# Patient Record
Sex: FEMALE | Race: OTHER | HISPANIC OR LATINO | Employment: UNEMPLOYED | URBAN - NONMETROPOLITAN AREA
[De-identification: names, ages, dates, MRNs, and addresses within clinical notes are randomized per-mention and may not be internally consistent; named-entity substitution may affect disease eponyms.]

---

## 2023-04-28 ENCOUNTER — HOSPITAL ENCOUNTER (OUTPATIENT)
Facility: HOSPITAL | Age: 38
Discharge: HOME | End: 2023-04-28
Attending: OBSTETRICS & GYNECOLOGY | Admitting: OBSTETRICS & GYNECOLOGY
Payer: MEDICAID

## 2023-04-28 LAB
ALBUMIN SERPL-MCNC: 3.7 G/DL (ref 3.4–5)
ALBUMIN/GLOB SERPL: 1.2 RATIO
ALP SERPL-CCNC: 152 UNIT/L (ref 50–144)
ALT SERPL-CCNC: 15 UNIT/L (ref 1–45)
ANION GAP SERPL CALC-SCNC: 4 MEQ/L (ref 2–13)
APPEARANCE UR: CLEAR
AST SERPL-CCNC: 34 UNIT/L (ref 14–36)
BACTERIA #/AREA URNS AUTO: ABNORMAL /HPF
BASOPHILS # BLD AUTO: 0.01 X10(3)/MCL (ref 0.01–0.08)
BASOPHILS NFR BLD AUTO: 0.1 % (ref 0.1–1.2)
BILIRUB UR QL STRIP.AUTO: NEGATIVE MG/DL
BILIRUBIN DIRECT+TOT PNL SERPL-MCNC: 0.7 MG/DL (ref 0–1)
BUN SERPL-MCNC: 8 MG/DL (ref 7–20)
CALCIUM SERPL-MCNC: 8.9 MG/DL (ref 8.4–10.2)
CHLORIDE SERPL-SCNC: 108 MMOL/L (ref 98–110)
CO2 SERPL-SCNC: 25 MMOL/L (ref 21–32)
COLOR UR AUTO: YELLOW
CREAT SERPL-MCNC: 0.49 MG/DL (ref 0.66–1.25)
CREAT/UREA NIT SERPL: 16 (ref 12–20)
EOSINOPHIL # BLD AUTO: 0.09 X10(3)/MCL (ref 0.04–0.36)
EOSINOPHIL NFR BLD AUTO: 1 % (ref 0.7–7)
ERYTHROCYTE [DISTWIDTH] IN BLOOD BY AUTOMATED COUNT: 15.6 % (ref 11–14.5)
GFR SERPLBLD CREATININE-BSD FMLA CKD-EPI: >90 MLS/MIN/1.73/M2
GLOBULIN SER-MCNC: 3 GM/DL (ref 2–3.9)
GLUCOSE SERPL-MCNC: 75 MG/DL (ref 70–115)
GLUCOSE UR QL STRIP.AUTO: NEGATIVE MG/DL
HBV SURFACE AG SERPL QL IA: NEGATIVE
HBV SURFACE AG SERPL QL IA: NORMAL
HCT VFR BLD AUTO: 35.4 % (ref 36–48)
HGB BLD-MCNC: 11.6 G/DL (ref 11.8–16)
IMM GRANULOCYTES # BLD AUTO: 0.05 X10(3)/MCL (ref 0–0.03)
IMM GRANULOCYTES NFR BLD AUTO: 0.6 % (ref 0–0.5)
KETONES UR QL STRIP.AUTO: NEGATIVE MG/DL
LEUKOCYTE ESTERASE UR QL STRIP.AUTO: NEGATIVE UNIT/L
LYMPHOCYTES # BLD AUTO: 2.03 X10(3)/MCL (ref 1.16–3.74)
LYMPHOCYTES NFR BLD AUTO: 23.4 % (ref 20–55)
MCH RBC QN AUTO: 27.5 PG (ref 27–34)
MCV RBC AUTO: 83.9 FL (ref 79–99)
MEAN CELL HEMOGLOBIN CONCENTRATION (OHS) G/DL: 32.8 G/DL (ref 31–37)
MONOCYTES # BLD AUTO: 0.49 X10(3)/MCL (ref 0.24–0.36)
MONOCYTES NFR BLD AUTO: 5.7 % (ref 4.7–12.5)
NEUTROPHILS # BLD AUTO: 6 X10(3)/MCL (ref 1.56–6.13)
NEUTROPHILS NFR BLD AUTO: 69.2 % (ref 37–73)
NITRITE UR QL STRIP.AUTO: NEGATIVE
NRBC BLD AUTO-RTO: 0 % (ref 0–1)
PH UR STRIP.AUTO: 6.5 [PH]
PLATELET # BLD AUTO: 196 X10(3)/MCL (ref 140–371)
PMV BLD AUTO: 11 FL (ref 9.4–12.4)
POTASSIUM SERPL-SCNC: 4.4 MMOL/L (ref 3.5–5.1)
PROT SERPL-MCNC: 6.7 GM/DL (ref 6.3–8.2)
PROT UR QL STRIP.AUTO: NEGATIVE MG/DL
RBC # BLD AUTO: 4.22 X10(6)/MCL (ref 4–5.1)
RBC #/AREA URNS AUTO: ABNORMAL /HPF
RBC UR QL AUTO: ABNORMAL UNIT/L
SODIUM SERPL-SCNC: 137 MMOL/L (ref 135–145)
SP GR UR STRIP.AUTO: 1.02
SQUAMOUS #/AREA URNS AUTO: ABNORMAL /HPF
UROBILINOGEN UR STRIP-ACNC: 0.2 MG/DL
WBC # SPEC AUTO: 8.7 X10(3)/MCL (ref 4–11.5)
WBC #/AREA URNS AUTO: ABNORMAL /HPF

## 2023-04-28 PROCEDURE — 85025 COMPLETE CBC W/AUTO DIFF WBC: CPT | Performed by: OBSTETRICS & GYNECOLOGY

## 2023-04-28 PROCEDURE — 36415 COLL VENOUS BLD VENIPUNCTURE: CPT | Performed by: OBSTETRICS & GYNECOLOGY

## 2023-04-28 PROCEDURE — 86780 TREPONEMA PALLIDUM: CPT | Performed by: OBSTETRICS & GYNECOLOGY

## 2023-04-28 PROCEDURE — 87340 HEPATITIS B SURFACE AG IA: CPT | Performed by: OBSTETRICS & GYNECOLOGY

## 2023-04-28 PROCEDURE — 81001 URINALYSIS AUTO W/SCOPE: CPT | Performed by: OBSTETRICS & GYNECOLOGY

## 2023-04-28 PROCEDURE — 80053 COMPREHEN METABOLIC PANEL: CPT | Performed by: OBSTETRICS & GYNECOLOGY

## 2023-04-28 PROCEDURE — 87389 HIV-1 AG W/HIV-1&-2 AB AG IA: CPT | Performed by: OBSTETRICS & GYNECOLOGY

## 2023-04-28 PROCEDURE — 87653 STREP B DNA AMP PROBE: CPT | Performed by: OBSTETRICS & GYNECOLOGY

## 2023-04-28 NOTE — H&P
[facility]  History & Physical    Patient Name: Natasha Gonzales  MRN: [id number]  Admission Date: 4/28/2023  Primary Care Provider: No primary care provider on file. None    Subjective:     Principal Problem:<principal problem not specified> Passage of clear vaginal Fluid    History of Present Illness: Passage of clear fluid, average (not heavy) amount this afternoon.     Obstetric HPI:  Patient reports mild contractions, active fetal movement, Yes  light vaginal bleeding with pelvic exam done when she came in L&D, Yes loss of fluid at home which is the reason she came ot the hospital. The fluid is clear, not heavy but did flow out of her. No fluid came out of her while we were observing her for over an hour.   Her OB nurse did a pelvic and Cervix was noted to be 4 cm/50%/03 vertex. Nitrazine test was negative and no fluid was noted with the examining fingers. She was noted to have fluid tinge vaginal discharge with the exam.   Monitoring for over an hour showed absence of a contraction pattern.     This pregnancy has been complicated by lack of prenatal care. [place] a [place] ago. She said she had one prenatal visit in [place] and had a blood test early on her pregnancy.     G 3 , Both past deliveries were not difficult and were 5.5 and 5.9 lbs. Respectively. She says her during her second pregnancy she had BP problems. No therapy was given her.   No past medical history on file. And so I interviewed her. She does not have any chronic medical illness.   No past surgical history on file. On interview, she says she had laparoscopic cholecystectomy in [place].     No medications prior to admission.   She took prenatal vitamins early in her pregnancy but has not taken any for several months now.     Review of patient's allergies indicates:  Not on File     Family History    None       Tobacco Use    Smoking status: Not on file     Smokeless tobacco: Not on file   Substance and Sexual Activity    Alcohol use: Not on file    Drug use: Not on file    Sexual activity: Not on file     She does not smoke or drink.   141, Mildly obese  Review of Systems   Constitutional: Negative.    Respiratory: Negative.     Cardiovascular: Negative.    Gastrointestinal: Negative.        Vital Signs (Most Recent):   131/84 Vital Signs (24h Range):   141/88        There is no height or weight on file to calculate BMI.    FHT: 140 Cat 2 (reassuring)  TOCO:  Q 0 minutes - No measurable contraction pattern noted.     Physical Exam:   Constitutional: She appears well-nourished.       Cardiovascular:  Normal rate.             Pulmonary/Chest: Effort normal.        Abdominal: Soft.                  No contraction pattern on the fetal monitor.   Repeat Pelvic Exam: No cervical change remaining at 4 cm/60%/-3, foremembrane palpable, intact, no fluid return in my examining fingers., vertex presentation Blood tinge discharge in the bedpad noted, not large amount.    Objective:       Cervix:  Dilation:  4  Effacement:  50%  Station: -3  Presentation: Vertex     Significant Labs:  No results found for: GROUPTRH, HEPBSAG, RUBELLAIGGSC, STREPBCULT, AFP, [id number]  CBC - HB 11.6Gm%; Hct- 36.1    Fetal Monitoring: Meets criteria for Reactive NST;   Tocotransducer monitoring: No regular contraction pattern.    CBC:   Recent Labs   Lab 23  1622   WBC 8.7   RBC 4.22   HGB 11.6*   HCT 35.4*      MCV 83.9   MCH 27.5   MCHC 32.8     I have personallly reviewed all pertinent lab results from the last 24 hours.    Assessment/Plan:     37 y.o. female  at Unknown Gestational Age with Intact Amniotic Sac  Inadequate Prenatal Care  Unruptured Amniotic Sac (Note: Patient came to L&D for evaluation of passage of vaginal fluid.)  Not in Labor.     There are no hospital problems to display for this patient.      OB ultrasound: Vertex presentation, 36W 5D Estimated Gestational  Age, BPP 8/8, EVELINE 10.9 cm., Placenta Posterior    Disposition: She is being discharged home with instructions to :  Return if she has vaginal fluid drainage again  Return if she is having contractions [id number] or more frequent contractions.  We emphasized that she needs prenatal care this coming week and the phone numbers of Sruthi Camacho and [facility] were given to her as well as their office numbers.   Her BMP is not in yet. I will check it before I leave the hospital.   She will also leave us urine for urinalysis before she goes home./  Prenatal lab work blood sample obtained an GBS screen was done today.   She will need OGTT during her prenatal visit.     Demarco Finley MD  Obstetrics  [facility]-Labor [facility] Delivery

## 2023-04-28 NOTE — NURSING
DR. HUNT AT BEDSIDE TO ASSESS PATIENT. DETAILED HISTORY AND PHYSICAL OBTAINED.  PRESENT. SVE DONE PER DR. MILNER. NO CERVICAL CHANGE NOTED UPON CERVICAL EXAM. LIGHT VAGINAL BLEEDING NOTED UPON EXAM. DR. MILNER NOTIFIED OF US RESULTS OF PATIENT EDC PER US 36.3, 8/8, EVELINE 10.9, PLACENTA POSTERIOR. AWAITING LABS BEFORE DISCHARGING PATIENT PER DR. MILNER. ORDERS.

## 2023-04-28 NOTE — NURSING
DR. HUNT NOTIFIED OF PATIENT STATUS. ORDERS NOTED TO GET U/S, GBS CULTURE, AND PRENATAL LABS. DR. HILL WILL COME IN ONE HOUR TO CHECK PATIENT'S CERVIX.

## 2023-04-28 NOTE — NURSING
DR. MILNER EDUCATED PATIENT ON LABOR PRECAUTIONS AND INSTRUCTED PATIENT TO FOLLOW UP WITH OBSTETRICIAN. PATIENT GIVEN INFORMATION TO CONTACT ALL 3 OB DRS. PATIENT VERBALIZED UNDERSTANDING. PATIENT DISCHARGED IN STABLE CONDITION.

## 2023-04-28 NOTE — NURSING
[place] PATIENT PRESENTED TO OB WITH COMPLAINTS OF LEAKING OF FLUID AND SMALL AMOUNT OF BLEEDING THIS AM AT 5 AM. PATIENT STATES SHE HAS MILD PAIN PER . PATIENT PLACED ON EFM AND TOCO MONITOR. NITRAZINE TEST NEGATIVE. SVE DONE. CERVIX 4/50%/-2.

## 2023-04-29 LAB
STREP B PCR (OHS): NOT DETECTED
T PALLIDUM AB SER QL: NONREACTIVE

## 2023-05-01 ENCOUNTER — HOSPITAL ENCOUNTER (EMERGENCY)
Facility: HOSPITAL | Age: 38
Discharge: ANOTHER HOSPITAL | End: 2023-05-01
Attending: INTERNAL MEDICINE
Payer: MEDICAID

## 2023-05-01 ENCOUNTER — HOSPITAL ENCOUNTER (INPATIENT)
Facility: HOSPITAL | Age: 38
LOS: 2 days | Discharge: HOME | End: 2023-05-03
Attending: OBSTETRICS & GYNECOLOGY | Admitting: OBSTETRICS & GYNECOLOGY
Payer: MEDICAID

## 2023-05-01 VITALS
OXYGEN SATURATION: 99 % | DIASTOLIC BLOOD PRESSURE: 108 MMHG | RESPIRATION RATE: 20 BRPM | TEMPERATURE: 98 F | HEART RATE: 83 BPM | WEIGHT: 182.38 LBS | SYSTOLIC BLOOD PRESSURE: 163 MMHG

## 2023-05-01 DIAGNOSIS — Z37.9 NORMAL LABOR: Primary | ICD-10-CM

## 2023-05-01 DIAGNOSIS — O47.9 UTERINE CONTRACTIONS DURING PREGNANCY: Primary | ICD-10-CM

## 2023-05-01 LAB
ALBUMIN SERPL-MCNC: 3 G/DL (ref 3.5–5)
ALBUMIN/GLOB SERPL: 0.9 RATIO (ref 1.1–2)
ALP SERPL-CCNC: 151 UNIT/L (ref 40–150)
ALT SERPL-CCNC: 11 UNIT/L (ref 0–55)
APTT PPP: 26.4 SECONDS (ref 23.2–33.7)
AST SERPL-CCNC: 18 UNIT/L (ref 5–34)
BASOPHILS # BLD AUTO: 0.03 X10(3)/MCL (ref 0–0.2)
BASOPHILS NFR BLD AUTO: 0.3 %
BILIRUBIN DIRECT+TOT PNL SERPL-MCNC: 0.5 MG/DL
BUN SERPL-MCNC: 8 MG/DL (ref 7–18.7)
CALCIUM SERPL-MCNC: 9.3 MG/DL (ref 8.4–10.2)
CHLORIDE SERPL-SCNC: 109 MMOL/L (ref 98–107)
CO2 SERPL-SCNC: 20 MMOL/L (ref 22–29)
CREAT SERPL-MCNC: 0.55 MG/DL (ref 0.55–1.02)
CREAT UR-MCNC: 104.7 MG/DL (ref 47–110)
EOSINOPHIL # BLD AUTO: 0.11 X10(3)/MCL (ref 0–0.9)
EOSINOPHIL NFR BLD AUTO: 1 %
ERYTHROCYTE [DISTWIDTH] IN BLOOD BY AUTOMATED COUNT: 15.6 % (ref 11.5–17)
GFR SERPLBLD CREATININE-BSD FMLA CKD-EPI: >60 MLS/MIN/1.73/M2
GLOBULIN SER-MCNC: 3.5 GM/DL (ref 2.4–3.5)
GLUCOSE SERPL-MCNC: 86 MG/DL (ref 74–100)
GROUP & RH: NORMAL
HCT VFR BLD AUTO: 36.9 % (ref 37–47)
HGB BLD-MCNC: 11.8 G/DL (ref 12–16)
HIV 1+2 AB+HIV1 P24 AG SERPL QL IA: NONREACTIVE
HIV 1+2 AB+HIV1 P24 AG SERPL QL IA: NONREACTIVE
IMM GRANULOCYTES # BLD AUTO: 0.08 X10(3)/MCL (ref 0–0.04)
IMM GRANULOCYTES NFR BLD AUTO: 0.7 %
INR BLD: 0.93 (ref 0–1.3)
LYMPHOCYTES # BLD AUTO: 2.38 X10(3)/MCL (ref 0.6–4.6)
LYMPHOCYTES NFR BLD AUTO: 20.8 %
MCH RBC QN AUTO: 27.1 PG (ref 27–31)
MCHC RBC AUTO-ENTMCNC: 32 G/DL (ref 33–36)
MCV RBC AUTO: 84.8 FL (ref 80–94)
MONOCYTES # BLD AUTO: 0.45 X10(3)/MCL (ref 0.1–1.3)
MONOCYTES NFR BLD AUTO: 3.9 %
NEUTROPHILS # BLD AUTO: 8.39 X10(3)/MCL (ref 2.1–9.2)
NEUTROPHILS NFR BLD AUTO: 73.3 %
PLATELET # BLD AUTO: 202 X10(3)/MCL (ref 130–400)
PMV BLD AUTO: 11.4 FL (ref 7.4–10.4)
POTASSIUM SERPL-SCNC: 3.9 MMOL/L (ref 3.5–5.1)
PROT SERPL-MCNC: 6.5 GM/DL (ref 6.4–8.3)
PROT UR STRIP-MCNC: <6.8 MG/DL
PROTHROMBIN TIME: 12.8 SECONDS (ref 12.5–14.5)
RBC # BLD AUTO: 4.35 X10(6)/MCL (ref 4.2–5.4)
SODIUM SERPL-SCNC: 138 MMOL/L (ref 136–145)
WBC # SPEC AUTO: 11.4 X10(3)/MCL (ref 4.5–11.5)

## 2023-05-01 PROCEDURE — 85730 THROMBOPLASTIN TIME PARTIAL: CPT | Performed by: INTERNAL MEDICINE

## 2023-05-01 PROCEDURE — 85025 COMPLETE CBC W/AUTO DIFF WBC: CPT | Performed by: INTERNAL MEDICINE

## 2023-05-01 PROCEDURE — 87389 HIV-1 AG W/HIV-1&-2 AB AG IA: CPT | Performed by: OBSTETRICS & GYNECOLOGY

## 2023-05-01 PROCEDURE — 63600175 PHARM REV CODE 636 W HCPCS

## 2023-05-01 PROCEDURE — 72100002 HC LABOR CARE, 1ST 8 HOURS

## 2023-05-01 PROCEDURE — 86900 BLOOD TYPING SEROLOGIC ABO: CPT | Performed by: INTERNAL MEDICINE

## 2023-05-01 PROCEDURE — 82570 ASSAY OF URINE CREATININE: CPT

## 2023-05-01 PROCEDURE — 72200004 HC VAGINAL DELIVERY LEVEL I

## 2023-05-01 PROCEDURE — 90471 IMMUNIZATION ADMIN: CPT | Performed by: OBSTETRICS & GYNECOLOGY

## 2023-05-01 PROCEDURE — 80053 COMPREHEN METABOLIC PANEL: CPT | Performed by: INTERNAL MEDICINE

## 2023-05-01 PROCEDURE — 85610 PROTHROMBIN TIME: CPT | Performed by: INTERNAL MEDICINE

## 2023-05-01 PROCEDURE — 25000003 PHARM REV CODE 250: Performed by: OBSTETRICS & GYNECOLOGY

## 2023-05-01 PROCEDURE — 63600175 PHARM REV CODE 636 W HCPCS: Performed by: OBSTETRICS & GYNECOLOGY

## 2023-05-01 PROCEDURE — 99285 EMERGENCY DEPT VISIT HI MDM: CPT

## 2023-05-01 PROCEDURE — 99999 HC NO LEVEL OF SERVICE - ED ONLY: CPT

## 2023-05-01 PROCEDURE — 11000001 HC ACUTE MED/SURG PRIVATE ROOM

## 2023-05-01 PROCEDURE — 25000003 PHARM REV CODE 250: Performed by: INTERNAL MEDICINE

## 2023-05-01 PROCEDURE — 90715 TDAP VACCINE 7 YRS/> IM: CPT | Performed by: OBSTETRICS & GYNECOLOGY

## 2023-05-01 RX ORDER — OXYTOCIN/RINGER'S LACTATE 30/500 ML
95 PLASTIC BAG, INJECTION (ML) INTRAVENOUS ONCE
Status: DISCONTINUED | OUTPATIENT
Start: 2023-05-01 | End: 2023-05-03 | Stop reason: HOSPADM

## 2023-05-01 RX ORDER — OXYTOCIN/RINGER'S LACTATE 30/500 ML
PLASTIC BAG, INJECTION (ML) INTRAVENOUS
Status: COMPLETED
Start: 2023-05-01 | End: 2023-05-01

## 2023-05-01 RX ORDER — PROCHLORPERAZINE EDISYLATE 5 MG/ML
5 INJECTION INTRAMUSCULAR; INTRAVENOUS EVERY 6 HOURS PRN
Status: DISCONTINUED | OUTPATIENT
Start: 2023-05-01 | End: 2023-05-03 | Stop reason: HOSPADM

## 2023-05-01 RX ORDER — PRENATAL WITH FERROUS FUM AND FOLIC ACID 3080; 920; 120; 400; 22; 1.84; 3; 20; 10; 1; 12; 200; 27; 25; 2 [IU]/1; [IU]/1; MG/1; [IU]/1; MG/1; MG/1; MG/1; MG/1; MG/1; MG/1; UG/1; MG/1; MG/1; MG/1; MG/1
1 TABLET ORAL DAILY
Status: DISCONTINUED | OUTPATIENT
Start: 2023-05-01 | End: 2023-05-03 | Stop reason: HOSPADM

## 2023-05-01 RX ORDER — DOCUSATE SODIUM 100 MG/1
200 CAPSULE, LIQUID FILLED ORAL 2 TIMES DAILY PRN
Status: DISCONTINUED | OUTPATIENT
Start: 2023-05-01 | End: 2023-05-03 | Stop reason: HOSPADM

## 2023-05-01 RX ORDER — HYDROCODONE BITARTRATE AND ACETAMINOPHEN 5; 325 MG/1; MG/1
1 TABLET ORAL EVERY 4 HOURS PRN
Status: DISCONTINUED | OUTPATIENT
Start: 2023-05-01 | End: 2023-05-03 | Stop reason: HOSPADM

## 2023-05-01 RX ORDER — ACETAMINOPHEN 325 MG/1
650 TABLET ORAL EVERY 6 HOURS PRN
Status: DISCONTINUED | OUTPATIENT
Start: 2023-05-01 | End: 2023-05-03 | Stop reason: HOSPADM

## 2023-05-01 RX ORDER — METHYLERGONOVINE MALEATE 0.2 MG/ML
200 INJECTION INTRAVENOUS
Status: DISCONTINUED | OUTPATIENT
Start: 2023-05-01 | End: 2023-05-03 | Stop reason: HOSPADM

## 2023-05-01 RX ORDER — ONDANSETRON 4 MG/1
8 TABLET, ORALLY DISINTEGRATING ORAL EVERY 8 HOURS PRN
Status: DISCONTINUED | OUTPATIENT
Start: 2023-05-01 | End: 2023-05-03 | Stop reason: HOSPADM

## 2023-05-01 RX ORDER — DIPHENHYDRAMINE HYDROCHLORIDE 50 MG/ML
25 INJECTION INTRAMUSCULAR; INTRAVENOUS EVERY 4 HOURS PRN
Status: DISCONTINUED | OUTPATIENT
Start: 2023-05-01 | End: 2023-05-03 | Stop reason: HOSPADM

## 2023-05-01 RX ORDER — OXYTOCIN/RINGER'S LACTATE 30/500 ML
95 PLASTIC BAG, INJECTION (ML) INTRAVENOUS ONCE AS NEEDED
Status: DISCONTINUED | OUTPATIENT
Start: 2023-05-01 | End: 2023-05-03 | Stop reason: HOSPADM

## 2023-05-01 RX ORDER — SODIUM CHLORIDE 0.9 % (FLUSH) 0.9 %
2 SYRINGE (ML) INJECTION EVERY 8 HOURS PRN
Status: DISCONTINUED | OUTPATIENT
Start: 2023-05-01 | End: 2023-05-03 | Stop reason: HOSPADM

## 2023-05-01 RX ORDER — HYDROCODONE BITARTRATE AND ACETAMINOPHEN 5; 325 MG/1; MG/1
1 TABLET ORAL EVERY 4 HOURS PRN
Status: DISCONTINUED | OUTPATIENT
Start: 2023-05-01 | End: 2023-05-01

## 2023-05-01 RX ORDER — DEXTROSE, SODIUM CHLORIDE, SODIUM LACTATE, POTASSIUM CHLORIDE, AND CALCIUM CHLORIDE 5; .6; .31; .03; .02 G/100ML; G/100ML; G/100ML; G/100ML; G/100ML
INJECTION, SOLUTION INTRAVENOUS CONTINUOUS
Status: DISCONTINUED | OUTPATIENT
Start: 2023-05-01 | End: 2023-05-03 | Stop reason: HOSPADM

## 2023-05-01 RX ORDER — MISOPROSTOL 100 UG/1
800 TABLET ORAL ONCE AS NEEDED
Status: DISCONTINUED | OUTPATIENT
Start: 2023-05-01 | End: 2023-05-03 | Stop reason: HOSPADM

## 2023-05-01 RX ORDER — SODIUM CHLORIDE 9 MG/ML
1000 INJECTION, SOLUTION INTRAVENOUS
Status: COMPLETED | OUTPATIENT
Start: 2023-05-01 | End: 2023-05-01

## 2023-05-01 RX ORDER — SIMETHICONE 80 MG
1 TABLET,CHEWABLE ORAL EVERY 6 HOURS PRN
Status: DISCONTINUED | OUTPATIENT
Start: 2023-05-01 | End: 2023-05-03 | Stop reason: HOSPADM

## 2023-05-01 RX ORDER — CARBOPROST TROMETHAMINE 250 UG/ML
250 INJECTION, SOLUTION INTRAMUSCULAR
Status: DISCONTINUED | OUTPATIENT
Start: 2023-05-01 | End: 2023-05-03 | Stop reason: HOSPADM

## 2023-05-01 RX ORDER — HYDROCORTISONE 25 MG/G
CREAM TOPICAL 3 TIMES DAILY PRN
Status: DISCONTINUED | OUTPATIENT
Start: 2023-05-01 | End: 2023-05-03 | Stop reason: HOSPADM

## 2023-05-01 RX ORDER — OXYTOCIN 10 [USP'U]/ML
10 INJECTION, SOLUTION INTRAMUSCULAR; INTRAVENOUS ONCE AS NEEDED
Status: DISCONTINUED | OUTPATIENT
Start: 2023-05-01 | End: 2023-05-03 | Stop reason: HOSPADM

## 2023-05-01 RX ORDER — IBUPROFEN 600 MG/1
600 TABLET ORAL EVERY 6 HOURS
Status: DISCONTINUED | OUTPATIENT
Start: 2023-05-01 | End: 2023-05-03 | Stop reason: HOSPADM

## 2023-05-01 RX ORDER — DIPHENHYDRAMINE HCL 25 MG
25 CAPSULE ORAL EVERY 4 HOURS PRN
Status: DISCONTINUED | OUTPATIENT
Start: 2023-05-01 | End: 2023-05-03 | Stop reason: HOSPADM

## 2023-05-01 RX ORDER — OXYTOCIN/RINGER'S LACTATE 30/500 ML
334 PLASTIC BAG, INJECTION (ML) INTRAVENOUS ONCE AS NEEDED
Status: COMPLETED | OUTPATIENT
Start: 2023-05-01 | End: 2023-05-01

## 2023-05-01 RX ADMIN — SODIUM CHLORIDE 1000 ML: 9 INJECTION, SOLUTION INTRAVENOUS at 02:05

## 2023-05-01 RX ADMIN — TETANUS TOXOID, REDUCED DIPHTHERIA TOXOID AND ACELLULAR PERTUSSIS VACCINE, ADSORBED 0.5 ML: 5; 2.5; 8; 8; 2.5 SUSPENSION INTRAMUSCULAR at 05:05

## 2023-05-01 RX ADMIN — Medication 334 MILLI-UNITS/MIN: at 03:05

## 2023-05-01 RX ADMIN — IBUPROFEN 600 MG: 600 TABLET, FILM COATED ORAL at 06:05

## 2023-05-01 RX ADMIN — HYDROCODONE BITARTRATE AND ACETAMINOPHEN 1 TABLET: 5; 325 TABLET ORAL at 04:05

## 2023-05-01 NOTE — OP NOTE
OPERATIVE REPORT    Date of Procedure: 2023    Patient Name: Natasha Gonzales    MRN: [id number]    Surgeon: Manny Reddy MD    Assistant:     Pre-Operative Diagnosis:  Second-stage labor  Post-Operative Diagnosis:  Delivered  Anesthesia: None    Procedure:       Complications: No    Estimated Blood Loss (EBL): 300           Findings: Live Female    Specimens: Cord Blood    Description:  Patient was prepped and draped in a normal sterile fashion. The 2nd stage of labor was within normal limits. The patient's head delivered in DEMETRIUS. Infants head delivered without any difficulties along with the shoulders and the rest of the infant. No Nuchal cords and Clear fluid noted. Mouth and nose were suctioned on the abdomen. Cord was doubly clamped and cut. The infant was handed to the waiting staff. Cord blood taken. The placenta was extracted spontaneously. There was No lacerations on the perineum and none laceration the labia  . Estimated blood loss was 300 patient tolerated the procedure well and instrument and sponge count were correct patient was to recover in labor room for approximately 1 hour.

## 2023-05-01 NOTE — H&P
Labor and Delivery Admit               History and Physical       Natasha Gonzales is a 37 y.o.  female with IUP at 37w4d weeks gestation who presents with as a transfer from [facility].  When she arrived she was fully dilated and transferred to a delivery bed.    Patient reports contractions, denies vaginal bleeding, reports LOF.   Fetal Movement: normal.    This IUP is complicated by minimal prenatal care recently moved from [place].      PMHx: No past medical history on file.    PSHx:   Cholecystectomy    All: Review of patient's allergies indicates:  No Known Allergies    Meds:   No medications prior to admission.       SH:   Social History     Socioeconomic History    Marital status: Single   Tobacco Use    Smoking status: Never    Smokeless tobacco: Never       FH: No family history on file.    OBHx:    all previous vaginal deliveries    Objective:       LMP  (LMP Unknown)     There were no vitals filed for this visit.    General:   alert, appears stated age and cooperative, no apparent distress   HENT:  normocephalic, atraumatic   Eyes:  extraocular movements and conjunctivae normal       Lungs:   no respiratory distress   Heart:   regular rate   Abdomen:  soft, non-tender, non-distended but gravid, no rebound or guarding    Extremities negative edema, negative erythema       Cervix:                                       Electronic Fetal Monitoring:  FHT:  bpm,  fetal heart rate tracing was not extensive enough to determine baseline accelerations and/or decelerations    Category:  Unknown                 TOCO: Contractions:  Patient pushing on arrival             Assessment:       37w4d weeks gestation transferred from [facility] arrived fully dilated and pushing             Plan:      - Admit to Labor and Delivery unit  - admission labs   -delivery of infant  IV fluids and Pitocin        This note was created with the assistance of MModal voice recognition  software. There may be transcription errors as a result of using this technology however minimal. Effort has been made to assure accuracy of transcription but any obvious errors or omissions should be clarified with the author of the document.

## 2023-05-01 NOTE — ED PROVIDER NOTES
Encounter Date: 2023  History from patient through      History     Chief Complaint   Patient presents with    Laboring     Began at 2300 with pelvic pain. Approx 8 months preg with no prenatal care. States water broke.      HPI    37 y.o. female  has no past medical history on file. Presenting with  Laboring (Began at 2300 with pelvic pain. Approx 8 months preg with no prenatal care. States water broke. )      Review of patient's allergies indicates:  No Known Allergies  No past medical history on file.  Past Surgical History:   Procedure Laterality Date     SECTION       No family history on file.  Social History     Tobacco Use    Smoking status: Never    Smokeless tobacco: Never     Review of Systems   HENT:  Negative for trouble swallowing and voice change.    Eyes:  Negative for visual disturbance.   Respiratory:  Negative for cough and shortness of breath.    Cardiovascular:  Negative for chest pain.   Gastrointestinal:  Negative for abdominal pain, diarrhea and vomiting.   Genitourinary:  Positive for pelvic pain and vaginal discharge. Negative for dysuria and hematuria.   Musculoskeletal:  Negative for gait problem.        No Pain.   Skin:  Negative for color change and rash.   Neurological:  Negative for headaches.   Psychiatric/Behavioral:  Negative for behavioral problems and sleep disturbance.    All other systems reviewed and are negative.    Physical Exam     Initial Vitals [23 0205]   BP Pulse Resp Temp SpO2   (!) 163/108 83 20 97.5 °F (36.4 °C) 99 %      MAP       --         Physical Exam    Nursing note and vitals reviewed.  Constitutional: She appears well-developed and well-nourished. No distress.   HENT:   Head: Atraumatic.   Eyes: EOM are normal.   Neck: Neck supple.   Cardiovascular:  Normal rate and regular rhythm.           Pulmonary/Chest: Breath sounds normal. No respiratory distress.   Abdominal: Abdomen is soft. Bowel sounds are normal. She exhibits  distension.   Genitourinary:    Pelvic exam was performed with patient supine.   Uterus is enlarged. Right adnexum displays no tenderness. Left adnexum displays no tenderness.    Vaginal discharge and bleeding present.   There is bleeding in the vagina.    Genitourinary Comments: 2 cm dilated soft cervix, minimal discharge in the vagina, blood-tinged,     Musculoskeletal:         General: Normal range of motion.      Cervical back: Neck supple.     Neurological: She is alert and oriented to person, place, and time. GCS score is 15. GCS eye subscore is 4. GCS verbal subscore is 5. GCS motor subscore is 6.   Skin: Skin is warm and dry.   Psychiatric: She has a normal mood and affect.       ED Course   Procedures    Orders Placed This Encounter   Procedures    CBC auto differential    Comprehensive metabolic panel    APTT    Protime-INR    CBC with Differential    ABO/Rh    PFC Facilitated Request     Medications   0.9%  NaCl infusion (1,000 mLs Intravenous New Bag 5/1/23 0237)     Admission on 05/01/2023, Discharged on 05/01/2023   Component Date Value Ref Range Status    Sodium Level 05/01/2023 138  136 - 145 mmol/L Final    Potassium Level 05/01/2023 3.9  3.5 - 5.1 mmol/L Final    Chloride 05/01/2023 109 (H)  98 - 107 mmol/L Final    Carbon Dioxide 05/01/2023 20 (L)  22 - 29 mmol/L Final    Glucose Level 05/01/2023 86  74 - 100 mg/dL Final    Blood Urea Nitrogen 05/01/2023 8.0  7.0 - 18.7 mg/dL Final    Creatinine 05/01/2023 0.55  0.55 - 1.02 mg/dL Final    Calcium Level Total 05/01/2023 9.3  8.4 - 10.2 mg/dL Final    Protein Total 05/01/2023 6.5  6.4 - 8.3 gm/dL Final    Albumin Level 05/01/2023 3.0 (L)  3.5 - 5.0 g/dL Final    Globulin 05/01/2023 3.5  2.4 - 3.5 gm/dL Final    Albumin/Globulin Ratio 05/01/2023 0.9 (L)  1.1 - 2.0 ratio Final    Bilirubin Total 05/01/2023 0.5  <=1.5 mg/dL Final    Alkaline Phosphatase 05/01/2023 151 (H)  40 - 150 unit/L Final    Alanine Aminotransferase 05/01/2023 11  0 - 55 unit/L  Final    Aspartate Aminotransferase 05/01/2023 18  5 - 34 unit/L Final    eGFR 05/01/2023 >60  mls/min/1.73/m2 Final    PTT 05/01/2023 26.4  23.2 - 33.7 seconds Final    PT 05/01/2023 12.8  12.5 - 14.5 seconds Final    INR 05/01/2023 0.93  0.00 - 1.30 Final    Group & Rh 05/01/2023 O POS   Final    WBC 05/01/2023 11.4  4.5 - 11.5 x10(3)/mcL Final    RBC 05/01/2023 4.35  4.20 - 5.40 x10(6)/mcL Final    Hgb 05/01/2023 11.8 (L)  12.0 - 16.0 g/dL Final    Hct 05/01/2023 36.9 (L)  37.0 - 47.0 % Final    MCV 05/01/2023 84.8  80.0 - 94.0 fL Final    MCH 05/01/2023 27.1  27.0 - 31.0 pg Final    MCHC 05/01/2023 32.0 (L)  33.0 - 36.0 g/dL Final    RDW 05/01/2023 15.6  11.5 - 17.0 % Final    Platelet 05/01/2023 202  130 - 400 x10(3)/mcL Final    MPV 05/01/2023 11.4 (H)  7.4 - 10.4 fL Final    Neut % 05/01/2023 73.3  % Final    Lymph % 05/01/2023 20.8  % Final    Mono % 05/01/2023 3.9  % Final    Eos % 05/01/2023 1.0  % Final    Basophil % 05/01/2023 0.3  % Final    Lymph # 05/01/2023 2.38  0.6 - 4.6 x10(3)/mcL Final    Neut # 05/01/2023 8.39  2.1 - 9.2 x10(3)/mcL Final    Mono # 05/01/2023 0.45  0.1 - 1.3 x10(3)/mcL Final    Eos # 05/01/2023 0.11  0 - 0.9 x10(3)/mcL Final    Baso # 05/01/2023 0.03  0 - 0.2 x10(3)/mcL Final    IG# 05/01/2023 0.08 (H)  0 - 0.04 x10(3)/mcL Final    IG% 05/01/2023 0.7  % Final       Labs Reviewed   COMPREHENSIVE METABOLIC PANEL - Abnormal; Notable for the following components:       Result Value    Chloride 109 (*)     Carbon Dioxide 20 (*)     Albumin Level 3.0 (*)     Albumin/Globulin Ratio 0.9 (*)     Alkaline Phosphatase 151 (*)     All other components within normal limits   CBC WITH DIFFERENTIAL - Abnormal; Notable for the following components:    Hgb 11.8 (*)     Hct 36.9 (*)     MCHC 32.0 (*)     MPV 11.4 (*)     IG# 0.08 (*)     All other components within normal limits   APTT - Normal   PROTIME-INR - Normal   CBC W/ AUTO DIFFERENTIAL    Narrative:     The following orders were created  for panel order CBC auto differential.  Procedure                               Abnormality         Status                     ---------                               -----------         ------                     CBC with Differential[[id number]]        Abnormal            Final result                 Please view results for these tests on the individual orders.   GROUP & RH          Imaging Results    None          Medications   0.9%  NaCl infusion (1,000 mLs Intravenous New Bag 23 0237)     Medical Decision Making:   Initial Assessment:   A0 8+ months pregnant patient arrives to the emergency room with no prenatal care.  Came to stay in [place] about a month back had seen a doctor on Friday who told her that she will be delivering this month, and today she had some gush of fluid and started having lower abdominal cramps so she came to the emergency room in [place].    She says she had hypertension during her last pregnancy, she never had a miscarriage, she has not had any prenatal care in this pregnancy.      Ultrasound done on Friday report is as under.  Impression:     1. A single intrauterine pregnancy is present and currently in a cephalic presentation with a BPP score of 8/8(NST to be performed in Ob).  2. The estimated gestational age is 36 weeks 3 days with no abnormalities noted on limited anatomic imaging appreciated.  See above comments.        Electronically signed by: Diogo Aguilar  Date:                                            2023  Time:                                           17:36  Clinical Tests:   Lab Tests: Ordered           ED Course as of 23 0553   Mon May 01, 2023   0222 I talked to Dr. Reddy who accepted the patient for transfer to [facility] OB department. [GQ]   0227 Ambulance came to the emergency room to  some other patient and we decided to send this patient to [facility] with them. [GQ]   0227 The going to take the patient  now. [GQ]   0230 As of Friday patient did not have any proteinuria, she does not have any swelling of the lower extremities, I do not think that she is in preeclampsia, her blood pressure is 163/108, patient says that last pregnancy also she had hypertension.  She is not taking any medicine for that though. [GQ]      ED Course User Index  [GQ] Cynthia Norman MD                 Clinical Impression:   Final diagnoses:  [O47.9] Uterine contractions during pregnancy (Primary)        ED Disposition Condition    Transfer to Another Facility Stable                Cynthia Norman MD  05/01/23 0228       Cynthia Norman MD  05/01/23 0231       Cynthia Norman MD  05/01/23 0301       Cynthia Norman MD  05/01/23 0553

## 2023-05-02 ENCOUNTER — ANESTHESIA EVENT (OUTPATIENT)
Dept: OBSTETRICS AND GYNECOLOGY | Facility: HOSPITAL | Age: 38
End: 2023-05-02

## 2023-05-02 ENCOUNTER — ANESTHESIA (OUTPATIENT)
Dept: OBSTETRICS AND GYNECOLOGY | Facility: HOSPITAL | Age: 38
End: 2023-05-02

## 2023-05-02 PROCEDURE — 11000001 HC ACUTE MED/SURG PRIVATE ROOM

## 2023-05-02 PROCEDURE — 25000003 PHARM REV CODE 250: Performed by: OBSTETRICS & GYNECOLOGY

## 2023-05-02 RX ADMIN — IBUPROFEN 600 MG: 600 TABLET, FILM COATED ORAL at 04:05

## 2023-05-02 RX ADMIN — IBUPROFEN 600 MG: 600 TABLET, FILM COATED ORAL at 10:05

## 2023-05-02 RX ADMIN — PRENATAL VITAMINS-IRON FUMARATE 27 MG IRON-FOLIC ACID 0.8 MG TABLET 1 TABLET: at 09:05

## 2023-05-02 NOTE — PLAN OF CARE
Problem: Urinary Retention (Postpartum Vaginal Delivery)  Goal: Effective Urinary Elimination  Outcome: Ongoing, Progressing     Problem: Pain (Postpartum Vaginal Delivery)  Goal: Acceptable Pain Control  Outcome: Ongoing, Progressing     Problem: Infection (Postpartum Vaginal Delivery)  Goal: Absence of Infection Signs/Symptoms  Outcome: Ongoing, Progressing     Problem: Bleeding (Postpartum Vaginal Delivery)  Goal: Hemostasis  Outcome: Ongoing, Progressing     Problem: Adjustment to Role Transition (Postpartum Vaginal Delivery)  Goal: Successful Maternal Role Transition  Outcome: Ongoing, Progressing

## 2023-05-02 NOTE — PROGRESS NOTES
Postpartum Progress Note    Natasha Gonzales is a 37 y.o.  s/p  currently Post-Partum day 1.      No acute events overnight. Patient reports mild abd pain. Patient denies any issues or complaints. Ambulating, voiding, and tolerating regular diet. Passing flatus. Lochia is increasing. No subjective fever or chills. Pain well controlled with scheduled ibuprofen and norco PRN.  No HA, vision changes, dizziness, N/V, CP, SOB, breast pain or lower extremity pain.  Currently bottle feeding.  Desires Nexplanon for contraception. Baby in room. She had on episode of elevated BP at the time of delivery, evaluation with urine protein/creatinine ratio wnl. BP has been stable since then.     Physical Exam:   Vitals:    23 0800 23 1600 23 2000 23 0018   BP: 132/81 130/86 119/70 125/75   Pulse: 75 82 81 73   Resp: 20 18 18    Temp: 97.9 °F (36.6 °C) 98.7 °F (37.1 °C) 98 °F (36.7 °C) 97.8 °F (36.6 °C)   TempSrc:           Gen: AAO x 3, NAD, resting in bed comfortably   CV: RRR, S1, S2, no murmurs  Resp: Non labored, lungs CTA bilaterally, good air movement  Abd: fundus firm palpable at the level of the umbilicus, abdomen soft and NT, + BS     Ext: trace pitting edema B/L LE, calves non tender and equal in size, DP/Radial 2+   Psych: cooperative, normal affect    Labs:  H/H: 11.8/36.9 on lab 23    Assessment/Plan  37 y.o. female   Status Post Vaginal delivery PPD#1.       Continue routine post-partum/operative care, continue to monitor  Patient does plan to Bottle feed  Continue PNV and Iron.  H/H stable and appropriate  Rh Positive, Varicella and Rubella status unknown. Had prenatal care in [place] as per pt   Up ad dianna; Pelvic Rest for 6 weeks.  DVT PPx - ambulation   Contraception: desires nexplanon. Will refer to the [facility] to establish care.  Dispo: Likely stable for discharge home on Postpartum day #2    Patient and Plan discussed with Dr. Kareem Ahumada MD  [facility]  Resident, [id number]

## 2023-05-02 NOTE — PROGRESS NOTES
Copied from baby's chart:     .. Admit Assessment    Patient Identification  Anmol Gonzales   :  2023  Admit Date:  2023  Attending Provider:  Cristo Hope MD              Referral:    received case management consult for discharge assessment due to limited or no prenatal care.      I met with mom, Natasha Gonzales, in her post-partum room. Mom presented appropriate and was cooperative.  used: ID# [id number]. Baby boy, Savi Gonzales was born via Vag. Delivery at 37.4 WGA weighing 6 lbs 0.7 oz. Mom reported she has a 12 y/o and a 5 y/o who she [place]. They are currently living with her Aunt Milly Gonzales, who she identified as her support. Aunt Milly did come into the room with supplies for mom and baby towards the end of assessment and introduced herself. Mom also identified her Aunt as adequate DC transportation for her and baby. Mom reported she did not have prenatal care due to her [place] to the [place] only a month ago. Mom had not decided on a pediatrician, but requested a list and vocalized her preference for a Tongan speaking pedi. Mom reported she does not work at this time. Mom plans to formula feed and currently receives WIC benefits. I provided Food stamp benefit resources in Tongan and english. Mom reported to having all necessary supplies; including a carseat and crib. I provided and discussed safe sleep and carseat safety resources in Tongan and english. Mom denied MH/PPD hx or current symptom concerns. Denied substance abuse and safety/DV concerns. Mom denied having any additional questions or concerns at this time.          Verified her face sheet information:      Living Situation:      Resides at [address] [facility] [address]  phone: [phone]         History/Current Symptoms of Anxiety/Depression:   Discussed PPD and identifying symptoms and provided mom with PPD counseling  resources and symptom brochure.       Identified Support:  Aunridge Carreon      History/Current Substance Use: denied     Indications of Abuse/Neglect:  denied        Emotional/Behavioral/Cognitive Issues: none present      Current RX Prescriptions: none     Adequate Discharge Transportation: yes     Mom's UDS: none collected     Baby's UDS: negative    DCFS status: no indications at this time, will follow MDS and report if necessary      Plan:     Patient/caregiver engaged in treatment planning process.      providing psychosocial and supportive counseling, resources, education, assistance and discharge planning as appropriate.  Patient/caregiver state understanding of  available resources,  following, remains available.        Patient/Caregiver informed of right to choose providers or agencies.  Patient/Caregiver provides permission to release any necessary information to [facility] and to Non[facility] agencies as needed to facilitate patient care, treatment planning, and patient discharge planning.  Written and verbal resources provided.

## 2023-05-03 VITALS
SYSTOLIC BLOOD PRESSURE: 117 MMHG | DIASTOLIC BLOOD PRESSURE: 81 MMHG | RESPIRATION RATE: 20 BRPM | TEMPERATURE: 98 F | HEART RATE: 82 BPM

## 2023-05-03 PROCEDURE — 25000003 PHARM REV CODE 250: Performed by: OBSTETRICS & GYNECOLOGY

## 2023-05-03 RX ORDER — IBUPROFEN 600 MG/1
600 TABLET ORAL EVERY 8 HOURS PRN
Qty: 30 TABLET | Refills: 0 | Status: SHIPPED | OUTPATIENT
Start: 2023-05-03 | End: 2023-06-02

## 2023-05-03 RX ORDER — DOCUSATE SODIUM 100 MG/1
200 CAPSULE, LIQUID FILLED ORAL 2 TIMES DAILY PRN
Qty: 60 CAPSULE | Refills: 0 | Status: SHIPPED | OUTPATIENT
Start: 2023-05-03 | End: 2023-06-02

## 2023-05-03 RX ORDER — PRENATAL WITH FERROUS FUM AND FOLIC ACID 3080; 920; 120; 400; 22; 1.84; 3; 20; 10; 1; 12; 200; 27; 25; 2 [IU]/1; [IU]/1; MG/1; [IU]/1; MG/1; MG/1; MG/1; MG/1; MG/1; MG/1; UG/1; MG/1; MG/1; MG/1; MG/1
1 TABLET ORAL DAILY
Qty: 30 TABLET | Refills: 2 | Status: SHIPPED | OUTPATIENT
Start: 2023-05-03 | End: 2024-05-02

## 2023-05-03 RX ORDER — ACETAMINOPHEN 325 MG/1
650 TABLET ORAL EVERY 6 HOURS PRN
Qty: 30 TABLET | Refills: 0 | Status: SHIPPED | OUTPATIENT
Start: 2023-05-03 | End: 2023-06-02

## 2023-05-03 RX ADMIN — IBUPROFEN 600 MG: 600 TABLET, FILM COATED ORAL at 10:05

## 2023-05-03 RX ADMIN — PRENATAL VITAMINS-IRON FUMARATE 27 MG IRON-FOLIC ACID 0.8 MG TABLET 1 TABLET: at 10:05

## 2023-05-03 RX ADMIN — IBUPROFEN 600 MG: 600 TABLET, FILM COATED ORAL at 04:05

## 2023-05-03 NOTE — PLAN OF CARE
Problem:  Fall Injury Risk  Goal: Absence of Fall, Infant Drop and Related Injury  Outcome: Ongoing, Progressing     Problem: Adult Inpatient Plan of Care  Goal: Plan of Care Review  Outcome: Ongoing, Progressing  Goal: Patient-Specific Goal (Individualized)  Outcome: Ongoing, Progressing  Goal: Absence of Hospital-Acquired Illness or Injury  Outcome: Ongoing, Progressing  Goal: Optimal Comfort and Wellbeing  Outcome: Ongoing, Progressing  Goal: Readiness for Transition of Care  Outcome: Ongoing, Progressing     Problem: Adjustment to Role Transition (Postpartum Vaginal Delivery)  Goal: Successful Maternal Role Transition  Outcome: Ongoing, Progressing     Problem: Bleeding (Postpartum Vaginal Delivery)  Goal: Hemostasis  Outcome: Ongoing, Progressing     Problem: Infection (Postpartum Vaginal Delivery)  Goal: Absence of Infection Signs/Symptoms  Outcome: Ongoing, Progressing     Problem: Pain (Postpartum Vaginal Delivery)  Goal: Acceptable Pain Control  Outcome: Ongoing, Progressing     Problem: Urinary Retention (Postpartum Vaginal Delivery)  Goal: Effective Urinary Elimination  Outcome: Ongoing, Progressing

## 2023-05-03 NOTE — DISCHARGE SUMMARY
Delivery Discharge Summary  [facility]    Admission date: 2023  Discharge date: 2023    Admit Dx:   Patient Active Problem List   Diagnosis     (spontaneous vaginal delivery)      Discharge Dx:    Patient Active Problem List   Diagnosis     (spontaneous vaginal delivery)       Procedure:     Hospital Course:  Natasha Gonzales is a 37 y.o. Syriac speaking now  female who was admitted on 2023 for delivery. She had the benefit of an epidural, and external fetal monitoring. Patient and infant tolerated labor well with pitocin. Patient delivered a viable  via  at 37^4 WGA on 23. Apgars 7/9.There was minimal blood loss. Please see delivery note for further details. Pt was in stable condition post delivery and was transferred to the Mother-Baby Unit. Her postpartum course was uncomplicated. On the date of discharge, patient's pain is controlled with oral pain medications. No fever or chills. She is ambulating without SOB or CP, and tolerating PO diet without N/V. Good urinary and bowel function. Reported lochia is within the normal range. Patient has had no complaints or questions that were not addressed during the duration of her stay. Pt in stable condition and ready for discharge on PPD 2.     Of note, she had one episode of elevated BP at the time of delivery, evaluation with urine protein/creatinine ratio was wnl. BP has been stable since then.    Physical exam:   Vitals:    23 0736   BP: 117/81   Pulse: 82   Resp: 20   Temp: 98.3 °F (36.8 °C)      Gen: AAO x 3, NAD  HEENT: NCAT, MMM, EOMI  CV: RRR, no murmurs; S1/S2  Resp: Clear to auscultation bilaterally with no wheezing, stridor, or upper airways sounds, good air movement, nontender chest  Abd: soft, +bowel sounds  : uterus firm below umbilicus, lochia stable  Ext: no edema, DP/Radial 2+     Pertinent studies:  Postpartum CBC  Lab Results   Component Value Date    WBC 11.4 2023    HGB 11.8 (L)  05/01/2023    HCT 36.9 (L) 05/01/2023    MCV 84.8 05/01/2023     05/01/2023       Delivery:    Episiotomy: None   Lacerations: None   Repair suture: None   Repair # of packets: 0   Blood loss (ml):       Birth information:  YOB: 2021   Time of birth: 3:19 AM   Sex: male   Delivery type: Vaginal, Spontaneous   Gestational Age: 37w4d    Delivery Clinician:      Other providers:       Additional  information:  Forceps:    Vacuum:    Breech:    Observed anomalies      Living?:           APGARS  One minute Five minutes Ten minutes   Skin color:         Heart rate:         Grimace:         Muscle tone:         Breathing:         Totals: 7  9        Placenta: Delivered:       appearance    Labs: No AM labs. Prenatal labs reviewed - Mother is Rh-positive, rubella and varicella status unknown. H&H: 11.8/36.8 on 5/1/23    Disposition: To home, self care    Follow Up: [facility] with Dr. Reddy in 6 weeks    Patient Instructions:     1. Report to OB ED or call clinic for any bleeding >2 pads/hour for >2 hours, temperature >100.4, foul smelling discharge, pain uncontrolled with medications, incision with warmth or drainage,or or for any other concerns.  2. Pelvic rest x6 weeks and no tub baths x 2 weeks  3. Rx for Motrin and Colace provided.  4. Post partum contraception: desires nexplanon        Current Discharge Medication List        START taking these medications    Details   acetaminophen (TYLENOL) 325 MG tablet Take 2 tablets (650 mg total) by mouth every 6 (six) hours as needed for Pain.  Qty: 30 tablet, Refills: 0      docusate sodium (COLACE) 100 MG capsule Take 2 capsules (200 mg total) by mouth 2 (two) times daily as needed for Constipation.  Qty: 60 capsule, Refills: 0      ibuprofen (ADVIL,MOTRIN) 600 MG tablet Take 1 tablet (600 mg total) by mouth every 8 (eight) hours as needed for Pain.  Qty: 30 tablet, Refills: 0      PNV,calcium 72/iron/folic acid (PRENATAL VITAMIN) Tab Take 1 tablet by  mouth once daily.  Qty: 30 tablet, Refills: 2             Time spent on discharge: 60 mins    Altaf Ahumada MD  [employer]  Resident, -

## 2023-05-05 ENCOUNTER — PATIENT OUTREACH (OUTPATIENT)
Dept: ADMINISTRATIVE | Facility: CLINIC | Age: 38
End: 2023-05-05
Payer: MEDICAID